# Patient Record
Sex: FEMALE | Race: BLACK OR AFRICAN AMERICAN
[De-identification: names, ages, dates, MRNs, and addresses within clinical notes are randomized per-mention and may not be internally consistent; named-entity substitution may affect disease eponyms.]

---

## 2019-07-08 NOTE — ER DOCUMENT REPORT
ED Medical Screen (RME)





- General


Chief Complaint: Pelvic Pain


Stated Complaint: PELVIC PAIN


Time Seen by Provider: 07/08/19 09:54


Mode of Arrival: Ambulatory


Information source: Patient


Notes: 





19-year-old female presented to ED for complaint of pelvic pain.  She states she

started her period yesterday last menstrual period before that was [place] 11-15.  

She states she usually cramps on the first day of her period but she has had 

pelvic pain for the last 4 days.  She states it is worse than the normal 

cramping that she has with her cycle.  She is still having pelvic pain but no 

other symptoms.  She is alert oriented respirations regular and unlabored 

speaking in full sentences walks with even steady gait.  She states she does not

smoke drink or do any drugs she works at a [employer] has a history of asthma

and had an abdominal surgery when she was a baby due to throwing up.  She states

she lives with her girlfriend.








I have greeted and performed a rapid initial assessment of this patient.  A 

comprehensive ED assessment and evaluation of the patient, analysis of test 

results and completion of medical decision making process will be conducted by 

an additional ED providers.











Dictation of this chart was performed using voice recognition software; 

therefore, there may be some unintended grammatical errors.


TRAVEL OUTSIDE OF THE [place] IN LAST 30 DAYS: No





- Related Data


Allergies/Adverse Reactions: 


                                        





No Known Allergies Allergy (Verified 07/08/19 08:36)


   











Past Medical History





- Social History


Chew tobacco use (# tins/day): No


Drug Abuse: None


Renal/ Medical History: Denies: Hx Peritoneal Dialysis


Past Surgical History: Reports: Hx Abdominal Surgery - at age of 1, GERD 

correction.





Physical Exam





- Vital signs


Vitals: 





                                        











Temp Pulse Resp BP Pulse Ox


 


 98.3 F   105 H  22 H  135/76 H  92 


 


 07/08/19 08:40  07/08/19 08:40  07/08/19 08:40  07/08/19 08:40  07/08/19 08:40














Course





- Vital Signs


Vital signs: 





                                        











Temp Pulse Resp BP Pulse Ox


 


 98.3 F   105 H  22 H  135/76 H  92 


 


 07/08/19 08:40  07/08/19 08:40  07/08/19 08:40  07/08/19 08:40  07/08/19 08:40

## 2019-07-08 NOTE — RADIOLOGY REPORT (SQ)
EXAM DESCRIPTION:  U/S NON-OB PELVIS TV W/O DOP



COMPLETED DATE/TIME:  7/8/2019 11:12 am



REASON FOR STUDY:  pelvic pain



COMPARISON:  None.



TECHNIQUE:  Dynamic and static grayscale images acquired of the pelvis via transvaginal approach and 
recorded on PACS. Additional selected color Doppler and spectral images recorded.



LIMITATIONS:  None.



FINDINGS:  UTERUS: Contour normal.  No mass.

ENDOMETRIAL STRIPE: No focal or generalized thickening. No masses.

CERVIX: No nabothian cysts.

RIGHT OVARY AND DOPPLER: Normal size. No worrisome masses. Normal arterial vascular flow without evid
ence for torsion.

LEFT OVARY AND DOPPLER: Normal size. No worrisome masses. Normal arterial vascular flow without evide
nce for torsion.

FREE FLUID: None noted.

OTHER: No other significant finding.

MEASUREMENTS:

UTERUS: 8.2 cm

ENDOMETRIAL STRIPE: 4.9 mm

RIGHT OVARY: 3.3 cm

LEFT OVARY: 4.2 cm



IMPRESSION:  NORMAL TRANSVAGINAL PELVIC ULTRASOUND.



TECHNICAL DOCUMENTATION:  JOB ID:  [id number]

 2011 [employer]- All Rights Reserved                          [id number]



Reading location - IP/workstation name: [email or web address]

## 2019-07-08 NOTE — ER DOCUMENT REPORT
ED General





- General


Chief Complaint: Pelvic Pain


Stated Complaint: PELVIC PAIN


Time Seen by Provider: 07/08/19 09:54


Primary Care Provider: 


JIMMY MARQUEZ MD [Primary Care Provider] - Follow up as needed


Mode of Arrival: Ambulatory


TRAVEL OUTSIDE OF THE [place] IN LAST 30 DAYS: No





- HPI


Notes: 





Patient is a 19-year-old female who presents to the emergency department for 

evaluation.  Her initial complaint is for pelvic pain.  States is been going on 

for the last 3 days.  She states it feels like cramping but stronger than 

normal.  She did start her period a few days early.  She is sexually active, 

denies any history of STDs.  She does see a gynecologist regularly.  Has regular

pelvic exams.  She is had no vaginal discharge, no vaginal lesions.  She denies 

any dysuria, hematuria, urinary frequency.  Patient also states she feels short 

of breath.  She has a history of asthma.  She states that her allergies seem to 

exacerbate her asthma.  She is not currently on any histamines.  She ran out of 

her albuterol inhaler last night.  No fevers or chills.  No chest pain.





- Related Data


Allergies/Adverse Reactions: 


                                        





No Known Allergies Allergy (Verified 07/08/19 08:36)


   











Past Medical History





- General


Information source: Patient





- Social History


Smoking Status: Never Smoker


Chew tobacco use (# tins/day): No


Drug Abuse: None


Family History: Reviewed & Not Pertinent


Patient has suicidal ideation: No


Patient has homicidal ideation: No


Renal/ Medical History: Denies: Hx Peritoneal Dialysis


Past Surgical History: Reports: Hx Abdominal Surgery - at age of 1, GERD 

correction.





Review of Systems





- Review of Systems


Constitutional: No symptoms reported


EENT: No symptoms reported


Cardiovascular: No symptoms reported


Respiratory: See HPI


Gastrointestinal: No symptoms reported


Genitourinary: No symptoms reported


Female Genitourinary: No symptoms reported


Musculoskeletal: No symptoms reported


Skin: No symptoms reported


Neurological/Psychological: No symptoms reported





Physical Exam





- Vital signs


Vitals: 


                                        











Temp Pulse Resp BP Pulse Ox


 


 98.3 F   105 H  22 H  135/76 H  92 


 


 07/08/19 08:40  07/08/19 08:40  07/08/19 08:40  07/08/19 08:40  07/08/19 08:40














- Notes


Notes: 





Vital signs reviewed, please refer to chart. Head is normocephalic, atraumatic. 

Pupils equal round, reactive to light.  Neck is supple without meningismus.  

Heart is regular rate and rhythm.  Lungs are clear to auscultation bilaterally. 

Abdomen is soft, nontender, normoactive bowel sounds throughout.  Extremities 

without cyanosis, clubbing. Posterior calves are nontender.  Peripheral pulses 

are equal.  Skin is warm and dry.  Patient is awake, alert, neurological exam is

nonfocal.





Course





- Re-evaluation


Re-evalutation: 





07/08/19 13:31


Patient presents emergency department for evaluation.  Laboratory investigations

and imaging is ordered through triage.  These failed to reveal any significant 

abnormality.  She does have some blood in her urine, but this is secondary to 

menstruation.  She has no urinary symptoms.  She gets regular pelvic exams and 

has no real discharge.  Her ultrasound was normal.  She was given an dose of her

albuterol inhaler here.  I will go ahead and send her home with that as well.  I

will also write a prescription strength antihistamine.  She is advised that this

might help her control her asthma better.  She states that during "allergy 

season" her asthma is always worsened.  She is not currently wheezing, I do not 

see any indication for steroids at this time.  We will have her follow-up with 

primary care and gynecology.  She is to return to the ED with worsening or new 

concerning symptoms of any sort.





- Vital Signs


Vital signs: 


                                        











Temp Pulse Resp BP Pulse Ox


 


 98.8 F   97   28 H  106/72   100 


 


 07/08/19 11:50  07/08/19 11:50  07/08/19 11:50  07/08/19 11:50  07/08/19 11:50














- Laboratory


Result Diagrams: 


                                 07/08/19 10:10





                                 07/08/19 10:10


Laboratory results interpreted by me: 


                                        











  07/08/19 07/08/19 07/08/19





  10:10 10:10 10:10


 


RDW  16.3 H  


 


Eosinophils %  12.0 H  


 


Absolute Eosinophils  0.7 H  


 


BUN   6 L 


 


Urine Protein    100 H


 


Urine Blood    LARGE H


 


Urine Bilirubin    MODERATE H


 


Urine Urobilinogen    2.0 H














- Diagnostic Test


Radiology reviewed: Reports reviewed


Radiology results interpreted by me: 





07/08/19 13:32





                                        





Transvaginal US  07/08/19 09:55


IMPRESSION:  NORMAL TRANSVAGINAL PELVIC ULTRASOUND.


 











                                        





Transvaginal US  07/08/19 09:55


IMPRESSION:  NORMAL TRANSVAGINAL PELVIC ULTRASOUND.


 














Discharge





- Discharge


Clinical Impression: 


 Pelvic pain





Asthma


Qualifiers:


 Asthma severity: mild Asthma persistence: intermittent Asthma complication 

type: uncomplicated Qualified Code(s): J45.20 - Mild intermittent asthma, 

uncomplicated





Condition: Stable


Disposition: HOME, SELF-CARE


Instructions:  Asthma ([facility]), Pelvic Pain ([facility])


Additional Instructions: 


Start antihistamine as prescribed.  Use albuterol inhaler at home as needed.  

Follow-up with your primary care physician as well as your gynecologist in the 

next week.  Return to the emergency department with worsening or new concerning 

symptoms.


Referrals: 


JIMMY MARQUEZ MD [Primary Care Provider] - Follow up as needed

## 2019-08-09 ENCOUNTER — HOSPITAL ENCOUNTER (EMERGENCY)
Dept: HOSPITAL 62 - ER | Age: 19
Discharge: HOME | End: 2019-08-09
Payer: SELF-PAY

## 2019-08-09 VITALS — SYSTOLIC BLOOD PRESSURE: 111 MMHG | DIASTOLIC BLOOD PRESSURE: 75 MMHG

## 2019-08-09 DIAGNOSIS — J45.909: ICD-10-CM

## 2019-08-09 DIAGNOSIS — R05: Primary | ICD-10-CM

## 2019-08-09 PROCEDURE — 99283 EMERGENCY DEPT VISIT LOW MDM: CPT

## 2019-08-09 NOTE — ER DOCUMENT REPORT
HPI





- HPI


Time Seen by Provider: 08/09/19 15:06


Pain Level: 1


Context: 


Patient is a 18-year-old female who presents the emergency department with a 

chief complaint of a cough and runny nose.  Patient has a history of asthma and 

seasonal allergies.  She states that she took a puff of her rescue inhaler and 

had some relief.  Patient is supposed to be using cetirizine, Flonase, and Qvar,

but has not been using them.  She states that she does not have any of them and 

does not know the last time she was at her primary care provider.  Denies any 

fever, body aches, chills, diarrhea, or any other symptoms.








- CONSTITUTIONAL


Constitutional: DENIES: Fever, Chills





- EENT


EENT: REPORTS: Nasal Drainage-Clear.  DENIES: Sore Throat, Ear Pain, Nasal 

Drainage-Purulent, Congestion





- NEURO


Neurology: DENIES: Headache





- RESPIRATORY


Respiratory: REPORTS: Coughing





- REPRODUCTIVE


Reproductive: DENIES: Pregnant:





- DERM


Skin Color: Normal


Skin Problems: None





Past Medical History





- Social History


Smoking Status: Never Smoker


Chew tobacco use (# tins/day): No


Frequency of alcohol use: None


Drug Abuse: None


Family History: Reviewed & Not Pertinent


Patient has suicidal ideation: No


Patient has homicidal ideation: No


Pulmonary Medical History: Reports: Hx Asthma


Renal/ Medical History: Denies: Hx Peritoneal Dialysis


Past Surgical History: Reports: Hx Abdominal Surgery - at age of 1, GERD 

correction.





Vertical Provider Document





- CONSTITUTIONAL


Agree With Documented VS: Yes


Exam Limitations: No Limitations


General Appearance: No Apparent Distress





- INFECTION CONTROL


TRAVEL OUTSIDE OF THE [place] IN LAST 30 DAYS: No





- HEENT


HEENT: Atraumatic





- NECK


Neck: Normal Inspection





- RESPIRATORY


Respiratory: Breath Sounds Normal, No Respiratory Distress





- CARDIOVASCULAR


Cardiovascular: Regular Rate, Regular Rhythm


Pulses: Normal: Radial





- MUSCULOSKELETAL/EXTREMETIES


Musculoskeletal/Extremeties: FROM





- NEURO


Level of Consciousness: Awake, Alert, Appropriate





- DERM


Integumentary: Warm, Dry, No Rash





Course





- Re-evaluation


Re-evalutation: 





08/09/19 15:16


I have a very low suspicion for pneumonia or any life-threatening etiology at 

this time.  She has not been on her medications.  I will refill her cetirizine 

and albuterol inhaler.  I will have her follow-up with her OB and primary care 

provider to have her Flonase and Qvar refilled.  Patient will follow-up with her

primary care provider in regards to this visit.  Follow-up precautions were 

given.  Verbal discharge instructions were given to the patient.  They 

verbalized understanding.  They are stable for discharge.








- Vital Signs


Vital signs: 


                                        











Temp Pulse Resp BP Pulse Ox


 


 98 F   104 H  18   111/75   97 


 


 08/09/19 14:53  08/09/19 14:53  08/09/19 14:53  08/09/19 14:53  08/09/19 14:53














Discharge





- Discharge


Clinical Impression: 


 Cough, History of asthma, History of seasonal allergies





Condition: Stable


Disposition: HOME, SELF-CARE


Additional Instructions: 


You were seen today in the emergency department for a cough.  Your cetirizine 

and rescue inhaler are being refilled.  Please follow-up with your primary care 

provider tomorrow. 


Prescriptions: 


Albuterol Sulfate [Proair HFA Inhalation Aerosol 8.5 gm MDI] 2 puff IH Q4H PRN 

#1 mdi


 PRN Reason: 


Cetirizine HCl [All Day Allergy] 10 mg PO DAILY #30 tablet


Referrals: 


JIMMY MARQUEZ MD [Primary Care Provider] - Follow up tomorrow

## 2019-08-19 ENCOUNTER — HOSPITAL ENCOUNTER (EMERGENCY)
Dept: HOSPITAL 62 - ER | Age: 19
Discharge: LEFT BEFORE DISCHARGE | End: 2019-08-19
Payer: SELF-PAY

## 2019-08-19 VITALS — DIASTOLIC BLOOD PRESSURE: 82 MMHG | SYSTOLIC BLOOD PRESSURE: 125 MMHG

## 2019-08-19 DIAGNOSIS — Z53.21: Primary | ICD-10-CM

## 2019-08-22 ENCOUNTER — HOSPITAL ENCOUNTER (EMERGENCY)
Dept: HOSPITAL 62 - ER | Age: 19
Discharge: HOME | End: 2019-08-22
Payer: MEDICAID

## 2019-08-22 VITALS — SYSTOLIC BLOOD PRESSURE: 118 MMHG | DIASTOLIC BLOOD PRESSURE: 71 MMHG

## 2019-08-22 DIAGNOSIS — Z3A.01: ICD-10-CM

## 2019-08-22 DIAGNOSIS — O23.41: Primary | ICD-10-CM

## 2019-08-22 DIAGNOSIS — J45.909: ICD-10-CM

## 2019-08-22 DIAGNOSIS — O99.511: ICD-10-CM

## 2019-08-22 DIAGNOSIS — O26.891: ICD-10-CM

## 2019-08-22 DIAGNOSIS — O20.0: ICD-10-CM

## 2019-08-22 DIAGNOSIS — R10.30: ICD-10-CM

## 2019-08-22 LAB
ADD MANUAL DIFF: NO
ALBUMIN SERPL-MCNC: 4.2 G/DL (ref 3.5–5)
ALP SERPL-CCNC: 65 U/L (ref 38–126)
ANION GAP SERPL CALC-SCNC: 9 MMOL/L (ref 5–19)
APPEARANCE UR: (no result)
APTT PPP: YELLOW S
AST SERPL-CCNC: 19 U/L (ref 14–36)
BASOPHILS # BLD AUTO: 0.1 10^3/UL (ref 0–0.2)
BASOPHILS NFR BLD AUTO: 0.7 % (ref 0–2)
BILIRUB DIRECT SERPL-MCNC: 0.2 MG/DL (ref 0–0.4)
BILIRUB SERPL-MCNC: 0.5 MG/DL (ref 0.2–1.3)
BILIRUB UR QL STRIP: NEGATIVE
BUN SERPL-MCNC: 5 MG/DL (ref 7–20)
CALCIUM: 9.7 MG/DL (ref 8.4–10.2)
CHLAM PCR: NOT DETECTED
CHLORIDE SERPL-SCNC: 103 MMOL/L (ref 98–107)
CO2 SERPL-SCNC: 25 MMOL/L (ref 22–30)
EOSINOPHIL # BLD AUTO: 0.5 10^3/UL (ref 0–0.6)
EOSINOPHIL NFR BLD AUTO: 6.6 % (ref 0–6)
ERYTHROCYTE [DISTWIDTH] IN BLOOD BY AUTOMATED COUNT: 17.6 % (ref 11.5–14)
GLUCOSE SERPL-MCNC: 71 MG/DL (ref 75–110)
GLUCOSE UR STRIP-MCNC: NEGATIVE MG/DL
HCT VFR BLD CALC: 37.2 % (ref 36–47)
HGB BLD-MCNC: 12.3 G/DL (ref 12–15.5)
KETONES UR STRIP-MCNC: NEGATIVE MG/DL
LYMPHOCYTES # BLD AUTO: 2.1 10^3/UL (ref 0.5–4.7)
LYMPHOCYTES NFR BLD AUTO: 25.6 % (ref 13–45)
MCH RBC QN AUTO: 27.9 PG (ref 27–33.4)
MCHC RBC AUTO-ENTMCNC: 33.1 G/DL (ref 32–36)
MCV RBC AUTO: 84 FL (ref 80–97)
MONOCYTES # BLD AUTO: 0.5 10^3/UL (ref 0.1–1.4)
MONOCYTES NFR BLD AUTO: 6.4 % (ref 3–13)
NEUTROPHILS # BLD AUTO: 4.9 10^3/UL (ref 1.7–8.2)
NEUTS SEG NFR BLD AUTO: 60.7 % (ref 42–78)
NITRITE UR QL STRIP: NEGATIVE
PH UR STRIP: 6 [PH] (ref 5–9)
PLATELET # BLD: 463 10^3/UL (ref 150–450)
POTASSIUM SERPL-SCNC: 3.7 MMOL/L (ref 3.6–5)
PROT SERPL-MCNC: 7 G/DL (ref 6.3–8.2)
PROT UR STRIP-MCNC: 30 MG/DL
RBC # BLD AUTO: 4.41 10^6/UL (ref 3.72–5.28)
SP GR UR STRIP: 1.02
TOTAL CELLS COUNTED % (AUTO): 100 %
UROBILINOGEN UR-MCNC: 4 MG/DL (ref ?–2)
WBC # BLD AUTO: 8 10^3/UL (ref 4–10.5)

## 2019-08-22 PROCEDURE — 93976 VASCULAR STUDY: CPT

## 2019-08-22 PROCEDURE — 76817 TRANSVAGINAL US OBSTETRIC: CPT

## 2019-08-22 PROCEDURE — 36415 COLL VENOUS BLD VENIPUNCTURE: CPT

## 2019-08-22 PROCEDURE — 86901 BLOOD TYPING SEROLOGIC RH(D): CPT

## 2019-08-22 PROCEDURE — 83690 ASSAY OF LIPASE: CPT

## 2019-08-22 PROCEDURE — 99284 EMERGENCY DEPT VISIT MOD MDM: CPT

## 2019-08-22 PROCEDURE — 87086 URINE CULTURE/COLONY COUNT: CPT

## 2019-08-22 PROCEDURE — 84702 CHORIONIC GONADOTROPIN TEST: CPT

## 2019-08-22 PROCEDURE — 86850 RBC ANTIBODY SCREEN: CPT

## 2019-08-22 PROCEDURE — 81001 URINALYSIS AUTO W/SCOPE: CPT

## 2019-08-22 PROCEDURE — 80053 COMPREHEN METABOLIC PANEL: CPT

## 2019-08-22 PROCEDURE — 86900 BLOOD TYPING SEROLOGIC ABO: CPT

## 2019-08-22 PROCEDURE — 87591 N.GONORRHOEAE DNA AMP PROB: CPT

## 2019-08-22 PROCEDURE — 87491 CHLMYD TRACH DNA AMP PROBE: CPT

## 2019-08-22 PROCEDURE — 85025 COMPLETE CBC W/AUTO DIFF WBC: CPT

## 2019-08-22 NOTE — ER DOCUMENT REPORT
HPI





- HPI


Patient complains to provider of: abdominal pain in pregnancy 


Time Seen by Provider: 19 08:13


Onset/Duration: Gradual, Intermittent


Quality of pain: Cramping


Severity: Mild


Pain Level: 2


Context: 





19 yr old female patient, with the listed pmh, here for intermittent crampy 

lower abdominal pain x 28 days.  She is , last menstrual period 2019.

 No complications in previous pregnancy.  She has her initial OB/GYN appointment

with [facility] care in [place] in 4 days.  She has not had a confirmed 

IUP in this pregnancy.  She states her pain is worse when she is standing at 

work all day and she needs a work note.  No abdominal surgeries other than 

remote pyloromyotomy as a baby.  No history of ovarian cysts, fibroids, 

endometriosis, or renal stones.  Normal bowel movements.  No UTI symptoms other 

than some intermittent frequency.  No URI symptoms.  No recent antibiotics or 

steroids.  No history of diabetes or asthma.  No vaginal 

discharge/complaints/bleeding/lesions or concerns for STDs and does not want a 

pelvic exam. Hasn't taken anything for her symptoms and does not want anything 

for them.  No excessive NSAID use, Tylenol use, or EtOH.  No prior history of 

gallbladder disease, pancreatitis, ulcers, GI bleed, GERD, IBS, Crohn's, or UC. 

no change in color or caliber or stool. no blood thinners. no fall or trauma.  

Pain is not worse with eating, movement, or deep breathing.  No other associated

sx. 


Exacerbated by: Standing


Relieved by: Remaining still


Similar symptoms previously: Yes


Recently seen / treated by doctor: No





- ROS


Systems Reviewed and Negative: Yes All other systems reviewed and negative - To 

include 10 systems, unless mentioned in the hpi.





- REPRODUCTIVE


LMP: 19


Reproductive: REPORTS: Pregnant:





- DERM


Skin Color: Normal





Past Medical History





- General


Information source: Patient





- Social History


Smoking Status: Never Smoker


Chew tobacco use (# tins/day): No


Frequency of alcohol use: None


Drug Abuse: None


Family History: Reviewed & Not Pertinent


Patient has suicidal ideation: No


Patient has homicidal ideation: No


Pulmonary Medical History: Reports: Hx Asthma


Endocrine Medical History: Reports: None


Renal/ Medical History: Denies: Hx Peritoneal Dialysis


Past Surgical History: Reports: Hx Abdominal Surgery - pyloromyotomy as a baby-

no complications since





- Immunizations


Immunizations up to date: Yes





Vertical Provider Document





- CONSTITUTIONAL


Agree With Documented VS: Yes


Exam Limitations: No Limitations


General Appearance: No Apparent Distress


Notes: 





>>>> PHYSICAL_EXAM:


  GENERAL_APPEARANCE: well_nourished, alert, cooperative, no_acute_distress, 

no_obvious_discomfort. Pleasant, young black female, smiling, speaking in full 

sentences, in no sign of pain or resp distress, easily sitting up, 7mth old 

infant in the room with her. she isn't breast feeding. 


  VITALS: reviewed, see vital signs table.


HEAD: normocephalic, atraumatic. no austin signs. no raccoon eyes. 


  EYES: PERRL, EOMI, (-)scleral icterus.


  NOSE: no_nasal_discharge.


  MOUTH: (-)decreased moisture.


  THROAT: no_tonsilar_inflammation/hypertrophy/exudate


  NECK: supple, no_neck_tenderness, full rom. full strength. no meningeal signs.




  BACK: no midline_back_tenderness. no step offs or deformities 


  CHEST_WALL: no_chest_tenderness.


  LUNGS: no_wheezing, (-)accessory muscle use, good air exchange bilateral.


  HEART: normal_rate, normal_rhythm, 


  ABDOMEN: normal_BS, soft, abdomen-diffuse, non-tender, uterus not palpated on 

exam, (-)guarding, (-)rebound, no distension or peritoneal signs. neg murphys. 

neg mcburneys. no cva tenderness. neg heel strike. neg obturator. neg psoas. neg

rovsign. 


  PELVIC: deferred by pt. 


  RECTAL: deferred


  EXTREMITIES: strength 5/5 in all_extremities, good pulses in all_extremities, 

no_edema, no_swelling\tenderness. full rom. normal gait. good hand . brisk 

cap refill. 


  SKIN: warm, dry, good_color, no_rash. no grossly visible overlying skin 

changes to suggest trauma


  NEURO:  motor_intact, sensory_intact. 


  MENTAL_STATUS: normal_affect, speech_clear, oriented_X_3, responds_

appropriately to questions.








- INFECTION CONTROL


TRAVEL OUTSIDE OF THE [place] IN LAST 30 DAYS: No





Course





- Re-evaluation


Re-evalutation: 





19 08:19


pt here for intermittent lower abdominal cramping for the last month.  She has 

not had a confirmed IUP.  Last menstrual period was 2019.  Labs were 

unremarkable other than a possible uti, since she is pregnant will tx with 

keflex. she is blood type A+. ucx, gc/chlam pending. advised will call with any 

abnormal results that require change in plan of care. she didn't want 

prophylactic gc/chlam tx. She denies any vaginal discharge or bleeding or 

concerns for STDs and did not want a pelvic exam. OB transvag US showed a living

IUP at 7w 4d, with fht at 141bpm, and was otherwise neg for anything acute per 

rad and reviewed by myself. pt informed of her findings. She has no abdominal 

tenderness to palpation on exam.  She is tolerating p.o.  She is well-appearing.

 Advised to continue prenatal vitamins.  Pelvic rest.  No heavy lifting.  

Tylenol for any pain. f/u with the obgyn for further std testing and workup of 

her sx and repeat of quant level in 2 days. Advised to f/u with obgyn in 1-2 

days. return for any worsening symptoms. vss. well appearing. satting well on 

ra. neurononfocal. pt understands and agrees to plan. 





On reexam, pt remained stable. nontoxic. well appearing. pain controlled. 

tolerating po. requesting to go home. serial abd exams remain benign 





 Documentation achieved through voice recording which my lead to some occasional

accidental typographical errors. Extensive efforts have been made to proof read 

documentation to make sure these are the least as possible. 


19 09:48





                                        











 Category Date Time Status


 


 U/S OB TRANSVAG W/DOPPLER [US] Stat Exams  19 08:23 Taken


 


 CBC WITH DIFF [HEME] Stat Lab  19 08:05 Completed


 


 CHLAM LC PCR URINE INHOUSE [MO] Stat Lab  19 09:15 Ordered


 


 COMPREHENSIVE METABOLIC PANEL [CHEM] Stat Lab  19 08:05 Completed


 


 HCG QUANTITATIVE [CHEM] Stat Lab  19 08:05 Completed


 


 LIPASE [CHEM] Stat Lab  19 08:05 Completed


 


 TYPE AND SCREEN [BBK] Stat Lab  19 08:35 Completed


 


 URINALYSIS [URIN] Stat Lab  19 08:35 Completed


 


 URINE CULTURE [MC] Stat Lab  19 08:35 Received











08/22/19 09:53














- Vital Signs


Vital signs: 


                                        











Temp Pulse Resp BP Pulse Ox


 


 97.9 F   89   18   128/58 H  98 


 


 19 07:33  19 07:33  19 07:33  19 07:33  19 07:33











                                        











  Temp Pulse Resp BP Pulse Ox


 


 19 11:52  98.7 F  85  16  118/71  100


 


 19 07:33  97.9 F  89  18  128/58 H  98

















- Laboratory


Result Diagrams: 


                                 19 08:05





                                 19 08:05


Laboratory results interpreted by me: 





                               Labs- Entire Visit











  19





  08:05 08:05 08:05


 


WBC   8.0 


 


RBC   4.41 


 


Hgb   12.3 


 


Hct   37.2 


 


MCV   84 


 


MCH   27.9 


 


MCHC   33.1 


 


RDW   17.6 H 


 


Plt Count   463 H 


 


Lymph % (Auto)   25.6 


 


Mono % (Auto)   6.4 


 


Eos % (Auto)   6.6 H 


 


Baso % (Auto)   0.7 


 


Absolute Neuts (auto)   4.9 


 


Absolute Lymphs (auto)   2.1 


 


Absolute Monos (auto)   0.5 


 


Absolute Eos (auto)   0.5 


 


Absolute Basos (auto)   0.1 


 


Seg Neutrophils %   60.7 


 


Sodium  137.2  


 


Potassium  3.7  


 


Chloride  103  


 


Carbon Dioxide  25  


 


Anion Gap  9  


 


BUN  5 L  


 


Creatinine  0.49 L  


 


Est GFR ( Amer)  > 60  


 


Est GFR (MDRD) Non-Af  > 60  


 


Glucose  71 L  


 


Calcium  9.7  


 


Total Bilirubin  0.5  


 


Direct Bilirubin  0.2  


 


Neonat Total Bilirubin  Not Reportable  


 


Neonat Direct Bilirubin  Not Reportable  


 


Neonat Indirect Bili  Not Reportable  


 


AST  19  


 


ALT  12  


 


Alkaline Phosphatase  65  


 


Total Protein  7.0  


 


Albumin  4.2  


 


Lipase    70.6


 


Beta HCG, Quant    735756.00 H


 


Total Beta HCG    POSITIVE


 


Urine Color   


 


Urine Appearance   


 


Urine pH   


 


Ur Specific Gravity   


 


Urine Protein   


 


Urine Glucose (UA)   


 


Urine Ketones   


 


Urine Blood   


 


Urine Nitrite   


 


Urine Bilirubin   


 


Urine Urobilinogen   


 


Ur Leukocyte Esterase   


 


Urine WBC (Auto)   


 


Urine RBC (Auto)   


 


Urine Bacteria (Auto)   


 


Squamous Epi Cells Auto   


 


Urine Mucus (Auto)   


 


Urine Ascorbic Acid   


 


Chlamydia DNA (PCR)   


 


N.gonorrhoeae DNA (PCR)   


 


Blood Type   


 


Antibody Screen   














  1919





  08:35 08:35 09:40


 


WBC   


 


RBC   


 


Hgb   


 


Hct   


 


MCV   


 


MCH   


 


MCHC   


 


RDW   


 


Plt Count   


 


Lymph % (Auto)   


 


Mono % (Auto)   


 


Eos % (Auto)   


 


Baso % (Auto)   


 


Absolute Neuts (auto)   


 


Absolute Lymphs (auto)   


 


Absolute Monos (auto)   


 


Absolute Eos (auto)   


 


Absolute Basos (auto)   


 


Seg Neutrophils %   


 


Sodium   


 


Potassium   


 


Chloride   


 


Carbon Dioxide   


 


Anion Gap   


 


BUN   


 


Creatinine   


 


Est GFR ( Amer)   


 


Est GFR (MDRD) Non-Af   


 


Glucose   


 


Calcium   


 


Total Bilirubin   


 


Direct Bilirubin   


 


Neonat Total Bilirubin   


 


Neonat Direct Bilirubin   


 


Neonat Indirect Bili   


 


AST   


 


ALT   


 


Alkaline Phosphatase   


 


Total Protein   


 


Albumin   


 


Lipase   


 


Beta HCG, Quant   


 


Total Beta HCG   


 


Urine Color  YELLOW  


 


Urine Appearance  CLOUDY  


 


Urine pH  6.0  


 


Ur Specific Gravity  1.021  


 


Urine Protein  30 H  


 


Urine Glucose (UA)  NEGATIVE  


 


Urine Ketones  NEGATIVE  


 


Urine Blood  NEGATIVE  


 


Urine Nitrite  NEGATIVE  


 


Urine Bilirubin  NEGATIVE  


 


Urine Urobilinogen  4.0 H  


 


Ur Leukocyte Esterase  LARGE H  


 


Urine WBC (Auto)  10  


 


Urine RBC (Auto)  3  


 


Urine Bacteria (Auto)  2+  


 


Squamous Epi Cells Auto  32  


 


Urine Mucus (Auto)  MANY  


 


Urine Ascorbic Acid  NEGATIVE  


 


Chlamydia DNA (PCR)    NOT DETECTED


 


N.gonorrhoeae DNA (PCR)    NOT DETECTED


 


Blood Type   A POSITIVE 


 


Antibody Screen   NEGATIVE 

















- Diagnostic Test


Radiology reviewed: Image reviewed, Reports reviewed


Radiology results interpreted by me: 





                                        





Transvaginal US  19 08:23


IMPRESSION:  LIVING INTRAUTERINE PREGNANCY.


EGA 7 weeks 4 days


Trimester of pregnancy: First trimester - 0 to 13 weeks.


 

















Discharge





- Discharge


Clinical Impression: 


 Threatened 





Abdominal pain during pregnancy


Qualifiers:


 Trimester: first trimester Qualified Code(s): O26.891 - Other specified 

pregnancy related conditions, first trimester; R10.9 - Unspecified abdominal 

pain





UTI (urinary tract infection)


Qualifiers:


 Urinary tract infection type: acute cystitis Hematuria presence: without 

hematuria Qualified Code(s): N30.00 - Acute cystitis without hematuria





Condition: Good


Disposition: HOME, SELF-CARE


Instructions:  Cephalexin ([facility]), Threatened Abortions ( Patients), Urin

sandra Tract Infection ([facility])


Additional Instructions: 


Follow-up with obgyn in 1 to 2 days.  Return for any worsening symptoms. take 

the medication as prescribed.  We will call you with any abnormal results that 

require change in plan of care.  Drink plenty of fluids.  Prenatal vitamins.  

Pelvic rest.  No heavy lifting.  Tylenol as needed for any pain.  Follow-up with

 your OB/GYN in 2 days for repeat pregnancy hormone level as discussed and 

further work-up.


Prescriptions: 


Cephalexin Monohydrate [Keflex 500 mg Capsule] 500 mg PO BID 7 Days #14 capsule


Forms:  Return to Work


Referrals: 


OFE NEWBY MD [Primary Care Provider] - Follow up tomorrow

## 2019-08-22 NOTE — RADIOLOGY REPORT (SQ)
EXAM DESCRIPTION:  U/S OB TRANSVAG W/DOPPLER



COMPLETED DATE/TIME:  2019 10:18 am



REASON FOR STUDY:  abd pain in preg



COMPARISON:  None.



TECHNIQUE:  Transvaginal static and realtime grayscale images acquired of the pelvis. Additional cristiano
cted spectral and color Doppler images recorded. All images stored on PACs.

[facility]

CLINICAL DATES:  6 weeks 4 days



LIMITATIONS:  None.



FINDINGS:  FETUS:  Single Living intrauterine pregnancy.

ULTRASOUND EGA: 7 weeks 4 days

ULTRASOUND LILIA: 2020

EFW: Not applicable less than 20 weeks.

CRL:  1.3 cm

FHR: 141  beats per minute.

FETAL SURVEY: Too early to assess.

AMNIOTIC FLUID: Adequate amount.

PLACENTA: Not yet developed due to early gestation.

SUBCHORIONIC BLEED: No

SIZE OF BLEED: Not applicable.

UTERUS: No masses. No anomalies.

CERVICAL LENGTH: 2.4 cm   Closed.

RIGHT ADNEXA: Normal ovary with normal vascular flow.

No adnexal free fluid.

No adnexal masses.

LEFT ADNEXA: Normal ovary with normal vascular flow.

No adnexal free fluid.

2.5 cm corpus luteum.

FREE FLUID: None.

OTHER: No other significant finding.



IMPRESSION:  LIVING INTRAUTERINE PREGNANCY.

EGA 7 weeks 4 days

Trimester of pregnancy: First trimester - 0 to 13 weeks.



TECHNICAL DOCUMENTATION:  JOB ID:  [id number]

  [employer]- All Rights Reserved                            rev



Reading location - IP/workstation name: [email or web address]

## 2019-10-28 ENCOUNTER — HOSPITAL ENCOUNTER (EMERGENCY)
Dept: HOSPITAL 62 - ER | Age: 19
Discharge: HOME | End: 2019-10-28
Payer: MEDICAID

## 2019-10-28 VITALS — SYSTOLIC BLOOD PRESSURE: 118 MMHG | DIASTOLIC BLOOD PRESSURE: 65 MMHG

## 2019-10-28 DIAGNOSIS — R05: ICD-10-CM

## 2019-10-28 DIAGNOSIS — O26.899: ICD-10-CM

## 2019-10-28 DIAGNOSIS — J45.901: ICD-10-CM

## 2019-10-28 DIAGNOSIS — Z3A.00: ICD-10-CM

## 2019-10-28 DIAGNOSIS — J34.89: ICD-10-CM

## 2019-10-28 DIAGNOSIS — O99.519: Primary | ICD-10-CM

## 2019-10-28 PROCEDURE — 99284 EMERGENCY DEPT VISIT MOD MDM: CPT

## 2019-10-28 PROCEDURE — 94640 AIRWAY INHALATION TREATMENT: CPT

## 2019-10-28 NOTE — ER DOCUMENT REPORT
HPI





- HPI


Patient complains to provider of: asthma


Time Seen by Provider: 10/28/19 09:51


Onset: Last week


Onset/Duration: Worse


Pain Level: Denies


Context: 





Patient presents complaining of flareup of her asthma.  Patient states she is 

run out of her inhaler.  Patient denies any fever.  Patient denies any chest 

pain.


Associated Symptoms: Nonproductive cough.  denies: Chest pain, Fever, Nausea, 

Vomiting


Exacerbated by: Denies


Relieved by: Denies


Similar symptoms previously: Yes


Recently seen / treated by doctor: No





- ROS


ROS below otherwise negative: Yes


Systems Reviewed and Negative: Yes All other systems reviewed and negative





- CONSTITUTIONAL


Constitutional: DENIES: Fever, Chills





- EENT


EENT: DENIES: Sore Throat, Ear Pain, Congestion





- CARDIOVASCULAR


Cardiovascular: DENIES: Chest pain





- RESPIRATORY


Respiratory: REPORTS: Coughing





- GASTROINTESTINAL


Gastrointestinal: DENIES: Abdominal Pain, Nausea





- URINARY


Urinary: DENIES: Dysuria





- REPRODUCTIVE


Reproductive: REPORTS: Pregnant:





- MUSCULOSKELETAL


Musculoskeletal: DENIES: Back Pain





- DERM


Skin Color: Normal


Skin Problems: None





Past Medical History





- General


Information source: Patient





- Social History


Smoking Status: Never Smoker


Frequency of alcohol use: None


Drug Abuse: None


Occupation: 


Family History: Reviewed & Not Pertinent


Pulmonary Medical History: Reports: Hx Asthma


Renal/ Medical History: Denies: Hx Peritoneal Dialysis


Past Surgical History: Reports: Hx Abdominal Surgery - pyloromyotomy as a baby-

no complications since





- Immunizations


Immunizations up to date: Yes





Vertical Provider Document





- CONSTITUTIONAL


Agree With Documented VS: Yes


Exam Limitations: No Limitations


General Appearance: WD/WN, No Apparent Distress





- INFECTION CONTROL


TRAVEL OUTSIDE OF THE [place] IN LAST 30 DAYS: No





- HEENT


HEENT: Atraumatic, Normocephalic.  negative: Pharyngeal Exudate, Pharyngeal 

Tenderness, Pharyngeal Erythema, Tympanic Membrane Red, Tympanic Membrane 

Bulging


Notes: 





Clear rhinorrhea





- NECK


Neck: Normal Inspection, Supple.  negative: Lymphadenopathy-Left, Lymph

adenopathy-Right





- RESPIRATORY


Respiratory: No Respiratory Distress, Chest Non-Tender, Wheezing





- CARDIOVASCULAR


Cardiovascular: Regular Rhythm, No Murmur, Tachycardia





- GI/ABDOMEN


Gastrointestinal: Abdomen Soft





- BACK


Back: Normal Inspection





- MUSCULOSKELETAL/EXTREMETIES


Musculoskeletal/Extremeties: MAEW





- NEURO


Level of Consciousness: Awake, Alert, Appropriate


Motor/Sensory: No Motor Deficit





- DERM


Integumentary: Warm, Dry, No Rash





Course





- Re-evaluation


Re-evalutation: 





10/28/19 10:43


Decreased wheezing bilaterally with increased air movement.  Patient nontoxic in

appearance.  Discussed worsening symptoms that patient should return immediately

for.  Patient verbalized understanding agrees with plan of care.





- Vital Signs


Vital signs: 


                                        











Temp Pulse Resp BP Pulse Ox


 


 97.7 F   108 H  16   118/65   97 


 


 10/28/19 09:44  10/28/19 09:44  10/28/19 09:44  10/28/19 09:44  10/28/19 09:44














Discharge





- Discharge


Clinical Impression: 


Asthma exacerbation


Qualifiers:


 Asthma severity: unspecified severity Asthma persistence: unspecified Qualified

Code(s): J45.901 - Unspecified asthma with (acute) exacerbation





Disposition: HOME, SELF-CARE


Instructions:  Asthma ([facility]), Inhaled Bronchodilators ([facility]), Steroid Medication


Additional Instructions: 


Return immediately for any new or worsening symptoms





Followup with your primary care provider, call tomorrow to make a followup 

appointment








Prescriptions: 


Prednisone [Deltasone 10 mg Tablet] 10 mg PO ASDIR PRN #21 tablet


 PRN Reason: 


Inhaler,Assist Device,Accesory [Optichamber] 1 each MC Q4 PRN #1 each


 PRN Reason: 


Albuterol Sulfate [Proair Hfa Inhalation Aerosol 8.5 gm Mdi] 2 puff IH Q4 PRN #1

mdi


 PRN Reason: 


Forms:  Return to Work


Referrals: 


OFE NEWBY MD [ACTIVE STAFF] - Follow up as needed

## 2019-11-06 ENCOUNTER — HOSPITAL ENCOUNTER (EMERGENCY)
Dept: HOSPITAL 62 - ER | Age: 19
Discharge: HOME | End: 2019-11-06
Payer: MEDICAID

## 2019-11-06 VITALS — DIASTOLIC BLOOD PRESSURE: 74 MMHG | SYSTOLIC BLOOD PRESSURE: 123 MMHG

## 2019-11-06 DIAGNOSIS — O26.92: Primary | ICD-10-CM

## 2019-11-06 DIAGNOSIS — R10.2: ICD-10-CM

## 2019-11-06 DIAGNOSIS — Z3A.17: ICD-10-CM

## 2019-11-06 LAB
ADD MANUAL DIFF: NO
ALBUMIN SERPL-MCNC: 3.7 G/DL (ref 3.5–5)
ALP SERPL-CCNC: 47 U/L (ref 38–126)
ANION GAP SERPL CALC-SCNC: 10 MMOL/L (ref 5–19)
APPEARANCE UR: (no result)
APTT PPP: YELLOW S
AST SERPL-CCNC: 16 U/L (ref 14–36)
BASOPHILS # BLD AUTO: 0.1 10^3/UL (ref 0–0.2)
BASOPHILS NFR BLD AUTO: 1.4 % (ref 0–2)
BILIRUB DIRECT SERPL-MCNC: 0.1 MG/DL (ref 0–0.4)
BILIRUB SERPL-MCNC: 0.3 MG/DL (ref 0.2–1.3)
BILIRUB UR QL STRIP: NEGATIVE
BUN SERPL-MCNC: 6 MG/DL (ref 7–20)
CALCIUM: 9.5 MG/DL (ref 8.4–10.2)
CHLAM PCR: NOT DETECTED
CHLORIDE SERPL-SCNC: 105 MMOL/L (ref 98–107)
CO2 SERPL-SCNC: 23 MMOL/L (ref 22–30)
EOSINOPHIL # BLD AUTO: 0.5 10^3/UL (ref 0–0.6)
EOSINOPHIL NFR BLD AUTO: 7.1 % (ref 0–6)
ERYTHROCYTE [DISTWIDTH] IN BLOOD BY AUTOMATED COUNT: 15.1 % (ref 11.5–14)
GLUCOSE SERPL-MCNC: 72 MG/DL (ref 75–110)
GLUCOSE UR STRIP-MCNC: NEGATIVE MG/DL
HCT VFR BLD CALC: 33.2 % (ref 36–47)
HGB BLD-MCNC: 11.3 G/DL (ref 12–15.5)
KETONES UR STRIP-MCNC: (no result) MG/DL
LYMPHOCYTES # BLD AUTO: 2.2 10^3/UL (ref 0.5–4.7)
LYMPHOCYTES NFR BLD AUTO: 30.4 % (ref 13–45)
MCH RBC QN AUTO: 30.3 PG (ref 27–33.4)
MCHC RBC AUTO-ENTMCNC: 34 G/DL (ref 32–36)
MCV RBC AUTO: 89 FL (ref 80–97)
MONOCYTES # BLD AUTO: 0.6 10^3/UL (ref 0.1–1.4)
MONOCYTES NFR BLD AUTO: 8.4 % (ref 3–13)
NEUTROPHILS # BLD AUTO: 3.8 10^3/UL (ref 1.7–8.2)
NEUTS SEG NFR BLD AUTO: 52.7 % (ref 42–78)
PH UR STRIP: 5 [PH] (ref 5–9)
PLATELET # BLD: 370 10^3/UL (ref 150–450)
POTASSIUM SERPL-SCNC: 4 MMOL/L (ref 3.6–5)
PROT SERPL-MCNC: 6.7 G/DL (ref 6.3–8.2)
PROT UR STRIP-MCNC: 30 MG/DL
RBC # BLD AUTO: 3.72 10^6/UL (ref 3.72–5.28)
SP GR UR STRIP: 1.03
TOTAL CELLS COUNTED % (AUTO): 100 %
UROBILINOGEN UR-MCNC: 2 MG/DL (ref ?–2)
WBC # BLD AUTO: 7.3 10^3/UL (ref 4–10.5)

## 2019-11-06 PROCEDURE — 80053 COMPREHEN METABOLIC PANEL: CPT

## 2019-11-06 PROCEDURE — 85025 COMPLETE CBC W/AUTO DIFF WBC: CPT

## 2019-11-06 PROCEDURE — 99284 EMERGENCY DEPT VISIT MOD MDM: CPT

## 2019-11-06 PROCEDURE — 87591 N.GONORRHOEAE DNA AMP PROB: CPT

## 2019-11-06 PROCEDURE — 87491 CHLMYD TRACH DNA AMP PROBE: CPT

## 2019-11-06 PROCEDURE — 81001 URINALYSIS AUTO W/SCOPE: CPT

## 2019-11-06 PROCEDURE — 36415 COLL VENOUS BLD VENIPUNCTURE: CPT

## 2019-11-06 PROCEDURE — 76815 OB US LIMITED FETUS(S): CPT

## 2019-11-06 NOTE — ER DOCUMENT REPORT
ED Medical Screen (RME)





- General


Chief Complaint: Abdominal Pain


Stated Complaint: ABDOMINAL PAIN/HEADACHES


Time Seen by Provider: 11/06/19 14:09


Mode of Arrival: Ambulatory


Information source: Patient


Notes: 





Patient presents 17 weeks pregnant complaining of lower abdominal pain for the 

past week.  Patient denies any fever nausea vomiting diarrhea or urinary 

symptoms.  Patient denies any vaginal bleeding or discharge.





I have greeted and performed a rapid initial assessment of this patient.  A 

comprehensive ED assessment and evaluation of the patient, analysis of test 

results and completion of the medical decision making process will be conducted 

by additional ED providers.


TRAVEL OUTSIDE OF THE [place] IN LAST 30 DAYS: No





- Related Data


Allergies/Adverse Reactions: 


                                        





No Known Allergies Allergy (Verified 08/22/19 07:31)


   











Past Medical History


Pulmonary Medical History: Reports: Hx Asthma


Renal/ Medical History: Denies: Hx Peritoneal Dialysis


Past Surgical History: Reports: Hx Abdominal Surgery - pyloromyotomy as a baby-n

o complications since





- Immunizations


Immunizations up to date: Yes





Physical Exam





- Vital signs


Vitals: 





                                        











Temp Pulse Resp BP Pulse Ox


 


 97.8 F   95   16   110/59 L  99 


 


 11/06/19 13:44  11/06/19 13:44  11/06/19 13:44  11/06/19 13:44  11/06/19 13:44














- Abdominal


Tenderness: Tender - Lower pelvic tenderness





Course





- Vital Signs


Vital signs: 





                                        











Temp Pulse Resp BP Pulse Ox


 


 97.8 F   95   16   110/59 L  99 


 


 11/06/19 13:44  11/06/19 13:44  11/06/19 13:44  11/06/19 13:44  11/06/19 13:44

## 2019-11-06 NOTE — ER DOCUMENT REPORT
HPI





- HPI


Patient complains to provider of: pelvic pain


Time Seen by Provider: 11/06/19 14:09


Onset: Last week


Onset/Duration: Waxing and waning


Quality of pain: Achy


Pain Level: Denies


Context: 





Patient presents with a lower pelvic pain off and on for the past week.  Patient

states she only has pain when she has at work lifting objects above her head.  

Patient states that whenever she is not doing this activity she has no pain.  

Patient denies any injury.  Patient denies any nausea vomiting or diarrhea.  No 

urinary symptoms.  Patient denies any vaginal bleeding or discharge.  Patient is

currently 17 weeks pregnant G2, P1.


Associated Symptoms: denies: Diarrhea, Fever


Exacerbated by: Denies


Relieved by: Denies


Similar symptoms previously: No


Recently seen / treated by doctor: No





- ROS


ROS below otherwise negative: Yes


Systems Reviewed and Negative: Yes All other systems reviewed and negative





- CONSTITUTIONAL


Constitutional: DENIES: Fever, Chills





- NEURO


Neurology: DENIES: Headache, Weakness





- RESPIRATORY


Respiratory: DENIES: Trouble Breathing, Coughing





- GASTROINTESTINAL


Gastrointestinal: REPORTS: Abdominal Pain.  DENIES: Nausea, Patient vomiting, 

Diarrhea, Black / Bloody Stools





- URINARY


Urinary: DENIES: Dysuria, Urgency, Frequency





- REPRODUCTIVE


Reproductive: REPORTS: Pregnant:.  DENIES: Abnormal bleeding / discharge





- MUSCULOSKELETAL


Musculoskeletal: DENIES: Back Pain





- DERM


Skin Color: Normal


Skin Problems: None





Past Medical History





- General


Information source: Patient





- Social History


Smoking Status: Never Smoker


Frequency of alcohol use: None


Drug Abuse: None


Occupation: processing plant


Family History: Reviewed & Not Pertinent


Patient has suicidal ideation: No


Patient has homicidal ideation: No


Pulmonary Medical History: Reports: Hx Asthma


Renal/ Medical History: Denies: Hx Peritoneal Dialysis


Past Surgical History: Reports: Hx Abdominal Surgery - pyloromyotomy as a baby-n

o complications since





- Immunizations


Immunizations up to date: Yes





Vertical Provider Document





- CONSTITUTIONAL


Agree With Documented VS: Yes


Exam Limitations: No Limitations


General Appearance: WD/WN, No Apparent Distress





- INFECTION CONTROL


TRAVEL OUTSIDE OF THE [place] IN LAST 30 DAYS: No





- HEENT


HEENT: Atraumatic, Normocephalic





- NECK


Neck: Normal Inspection, Supple.  negative: Lymphadenopathy-Left, 

Lymphadenopathy-Right





- RESPIRATORY


Respiratory: Breath Sounds Normal, No Respiratory Distress





- CARDIOVASCULAR


Cardiovascular: Regular Rate, Regular Rhythm





- GI/ABDOMEN


Gastrointestinal: Abdomen Soft, Abdomen Non-Tender, No Organomegaly, Normal 

Bowel Sounds





- BACK


Back: Normal Inspection.  negative: CVA Tenderness-Right, CVA Tenderness-Left





- MUSCULOSKELETAL/EXTREMETIES


Musculoskeletal/Extremeties: MAEW, FROM





- NEURO


Level of Consciousness: Awake, Alert, Appropriate


Motor/Sensory: No Motor Deficit





- DERM


Integumentary: Warm, Dry, No Rash





Course





- Re-evaluation


Re-evalutation: 





11/06/19 17:22


Patient's abdomen soft nontender.  Patient without any acute findings on 

diagnostic evaluation.  Patient reports pain only when she is at work lifting 

objects above her head.  Will provide patient with a note for work and encourage

outpatient follow-up with her OB/GYN for recheck.  No concern for appendicitis, 

pyelonephritis or any intra-abdominal acute process at this time.





- Vital Signs


Vital signs: 


                                        











Temp Pulse Resp BP Pulse Ox


 


 97.8 F   95   16   110/59 L  99 


 


 11/06/19 13:44  11/06/19 13:44  11/06/19 13:44  11/06/19 13:44  11/06/19 13:44














- Laboratory


Result Diagrams: 


                                 11/06/19 14:20





                                 11/06/19 14:20


Laboratory results interpreted by me: 


                                        











  11/06/19 11/06/19 11/06/19





  14:20 14:20 14:20


 


Hgb  11.3 L  


 


Hct  33.2 L  


 


RDW  15.1 H  


 


Eos % (Auto)  7.1 H  


 


BUN   6 L 


 


Creatinine   0.46 L 


 


Glucose   72 L 


 


Urine Protein    30 H


 


Urine Ketones    TRACE H


 


Urine Urobilinogen    2.0 H


 


Urine Ascorbic Acid    40 H











11/06/19 17:10


                               Labs- Entire Visit











  11/06/19 11/06/19 11/06/19





  14:20 14:20 14:20


 


WBC  7.3  


 


RBC  3.72  


 


Hgb  11.3 L  


 


Hct  33.2 L  


 


MCV  89  


 


MCH  30.3  


 


MCHC  34.0  


 


RDW  15.1 H  


 


Plt Count  370  


 


Lymph % (Auto)  30.4  


 


Mono % (Auto)  8.4  


 


Eos % (Auto)  7.1 H  


 


Baso % (Auto)  1.4  


 


Absolute Neuts (auto)  3.8  


 


Absolute Lymphs (auto)  2.2  


 


Absolute Monos (auto)  0.6  


 


Absolute Eos (auto)  0.5  


 


Absolute Basos (auto)  0.1  


 


Seg Neutrophils %  52.7  


 


Sodium   138.0 


 


Potassium   4.0 


 


Chloride   105 


 


Carbon Dioxide   23 


 


Anion Gap   10 


 


BUN   6 L 


 


Creatinine   0.46 L 


 


Est GFR ( Amer)   > 60 


 


Est GFR (MDRD) Non-Af   > 60 


 


Glucose   72 L 


 


Calcium   9.5 


 


Total Bilirubin   0.3 


 


Direct Bilirubin   0.1 


 


Neonat Total Bilirubin   Not Reportable 


 


Neonat Direct Bilirubin   Not Reportable 


 


Neonat Indirect Bili   Not Reportable 


 


AST   16 


 


ALT   8 


 


Alkaline Phosphatase   47 


 


Total Protein   6.7 


 


Albumin   3.7 


 


Urine Color   


 


Urine Appearance   


 


Urine pH   


 


Ur Specific Gravity   


 


Urine Protein   


 


Urine Glucose (UA)   


 


Urine Ketones   


 


Urine Blood   


 


Urine Nitrite (Reflex)   


 


Urine Bilirubin   


 


Urine Urobilinogen   


 


Leukocyte Esterase Rfl   


 


Urine RBC (Auto)   


 


U Hyaline Cast (Auto)   


 


Urine Bacteria (Auto)   


 


Urine WBC (Reflex)   


 


Squamous Epi Cells Auto   


 


Urine Mucus (Auto)   


 


Urine Ascorbic Acid   


 


Chlamydia DNA (PCR)    NOT DETECTED


 


N.gonorrhoeae DNA (PCR)    NOT DETECTED














  11/06/19





  14:20


 


WBC 


 


RBC 


 


Hgb 


 


Hct 


 


MCV 


 


MCH 


 


MCHC 


 


RDW 


 


Plt Count 


 


Lymph % (Auto) 


 


Mono % (Auto) 


 


Eos % (Auto) 


 


Baso % (Auto) 


 


Absolute Neuts (auto) 


 


Absolute Lymphs (auto) 


 


Absolute Monos (auto) 


 


Absolute Eos (auto) 


 


Absolute Basos (auto) 


 


Seg Neutrophils % 


 


Sodium 


 


Potassium 


 


Chloride 


 


Carbon Dioxide 


 


Anion Gap 


 


BUN 


 


Creatinine 


 


Est GFR ( Amer) 


 


Est GFR (MDRD) Non-Af 


 


Glucose 


 


Calcium 


 


Total Bilirubin 


 


Direct Bilirubin 


 


Neonat Total Bilirubin 


 


Neonat Direct Bilirubin 


 


Neonat Indirect Bili 


 


AST 


 


ALT 


 


Alkaline Phosphatase 


 


Total Protein 


 


Albumin 


 


Urine Color  YELLOW


 


Urine Appearance  SLIGHTLY-CLOUDY


 


Urine pH  5.0


 


Ur Specific Gravity  1.029


 


Urine Protein  30 H


 


Urine Glucose (UA)  NEGATIVE


 


Urine Ketones  TRACE H


 


Urine Blood  NEGATIVE


 


Urine Nitrite (Reflex)  NEGATIVE


 


Urine Bilirubin  NEGATIVE


 


Urine Urobilinogen  2.0 H


 


Leukocyte Esterase Rfl  NEGATIVE


 


Urine RBC (Auto)  1


 


U Hyaline Cast (Auto)  2


 


Urine Bacteria (Auto)  TRACE


 


Urine WBC (Reflex)  1


 


Squamous Epi Cells Auto  4


 


Urine Mucus (Auto)  MANY


 


Urine Ascorbic Acid  40 H


 


Chlamydia DNA (PCR) 


 


N.gonorrhoeae DNA (PCR) 














- Diagnostic Test


Radiology reviewed: Reports reviewed





Discharge





- Discharge


Clinical Impression: 


 Intrauterine pregnancy, resolved pelvic pain





Condition: Stable


Disposition: HOME, SELF-CARE


Instructions:  Acetaminophen, Pelvic Pain in Pregnancy and Round Ligament Pain 

([facility])


Additional Instructions: 


Return immediately for any new or worsening symptoms





Followup with your GYN care provider, call tomorrow to make a followup 

appointment





Avoid heavy lifting








Forms:  Return to Work


Referrals: 


[facility] [Provider Group] - Follow up as needed

## 2019-11-06 NOTE — RADIOLOGY REPORT (SQ)
EXAM DESCRIPTION:  U/S OB LIMITED



COMPLETED DATE/TIME:  11/6/2019 3:37 pm



REASON FOR STUDY:  lower pelvic pain



COMPARISON:  None.



TECHNIQUE:  Limited transabdominal grayscale ultrasound for evaluation of specific requested obstetri
miriam parameters.



LIMITATIONS:  None.



FINDINGS:  CERVICAL LENGTH: 3.3 cm.   Closed.

PHIL: Clear, adequate in volume.

FHR: 135 beats per minute.

PRESENTATION: Breech.

PLACENTA: Anterior location grade 1

FETAL ANATOMY: Not assessed

OTHER: No other significant findings.



IMPRESSION:  LIMITED OBSTETRICAL ULTRASOUND WITH MEASURED PARAMETERS DELINEATED ABOVE.

Trimester of pregnancy: Second trimester - 13 weeks 1 day to 27 weeks 6 days.



TECHNICAL DOCUMENTATION:  JOB ID:  [id number]

 2011 [employer]- All Rights Reserved



Reading location - IP/workstation name: [email or web address]

## 2019-11-22 ENCOUNTER — HOSPITAL ENCOUNTER (EMERGENCY)
Dept: HOSPITAL 62 - ER | Age: 19
Discharge: HOME | End: 2019-11-22
Payer: MEDICAID

## 2019-11-22 VITALS — DIASTOLIC BLOOD PRESSURE: 68 MMHG | SYSTOLIC BLOOD PRESSURE: 114 MMHG

## 2019-11-22 DIAGNOSIS — B96.89: ICD-10-CM

## 2019-11-22 DIAGNOSIS — O98.819: ICD-10-CM

## 2019-11-22 DIAGNOSIS — B37.3: ICD-10-CM

## 2019-11-22 DIAGNOSIS — J45.909: ICD-10-CM

## 2019-11-22 DIAGNOSIS — Z76.0: ICD-10-CM

## 2019-11-22 DIAGNOSIS — O23.599: Primary | ICD-10-CM

## 2019-11-22 DIAGNOSIS — O99.519: ICD-10-CM

## 2019-11-22 DIAGNOSIS — Z3A.00: ICD-10-CM

## 2019-11-22 LAB
APPEARANCE UR: CLEAR
APTT PPP: YELLOW S
BACTERIA (WET MOUNT): (no result)
BILIRUB UR QL STRIP: NEGATIVE
CHLAM PCR: NOT DETECTED
EPITHELIALS (WET MOUNT): (no result)
GLUCOSE UR STRIP-MCNC: 50 MG/DL
KETONES UR STRIP-MCNC: NEGATIVE MG/DL
NITRITE UR QL STRIP: NEGATIVE
PH UR STRIP: 8 [PH] (ref 5–9)
PROT UR STRIP-MCNC: NEGATIVE MG/DL
RBCS (WET MOUNT): (no result)
SP GR UR STRIP: 1.02
T.VAGINALIS (WET MOUNT): (no result)
UROBILINOGEN UR-MCNC: 2 MG/DL (ref ?–2)
WBCS (WET MOUNT): (no result)
YEAST (WET MOUNT): (no result)

## 2019-11-22 PROCEDURE — 87591 N.GONORRHOEAE DNA AMP PROB: CPT

## 2019-11-22 PROCEDURE — 81001 URINALYSIS AUTO W/SCOPE: CPT

## 2019-11-22 PROCEDURE — 87210 SMEAR WET MOUNT SALINE/INK: CPT

## 2019-11-22 PROCEDURE — 87491 CHLMYD TRACH DNA AMP PROBE: CPT

## 2019-11-22 PROCEDURE — 99284 EMERGENCY DEPT VISIT MOD MDM: CPT

## 2019-11-22 NOTE — ER DOCUMENT REPORT
ED General





- General


Chief Complaint: Vaginal Itching


Stated Complaint: PELVIC PAIN


Time Seen by Provider: 11/22/19 08:19


TRAVEL OUTSIDE OF THE [place] IN LAST 30 DAYS: No





- HPI


Notes: 





19-year-old female to the emergency department with complaints of vaginal 

itching for the past 2 to 3 days.  She states that she is about 19 weeks 

pregnant and is followed at [facility].  She states that she has not had any 

lower abdominal pain or vaginal bleeding.  She states that she has not seen a 

vaginal discharge.  She states that she does not typically have any bacterial 

vaginosis or yeast problems.  She denies concern for STDs.  She denies any 

urinary complaints.  She denies any chest pain, shortness of breath, fevers, 

chills, abdominal pain, back pain.





- Related Data


Allergies/Adverse Reactions: 


                                        





No Known Allergies Allergy (Verified 11/22/19 07:11)


   











Past Medical History





- General


Information source: Patient





- Social History


Smoking Status: Never Smoker


Frequency of alcohol use: None


Drug Abuse: None


Family History: Reviewed & Not Pertinent


Patient has suicidal ideation: No


Patient has homicidal ideation: No


Pulmonary Medical History: Reports: Hx Asthma


Renal/ Medical History: Denies: Hx Peritoneal Dialysis


Past Surgical History: Reports: Hx Abdominal Surgery - pyloromyotomy as a baby-

no complications since





- Immunizations


Immunizations up to date: Yes





Review of Systems





- Review of Systems


Constitutional: denies: Chills, Fever


EENT: No symptoms reported


Cardiovascular: denies: Chest pain, Palpitations


Respiratory: denies: Cough, Short of breath


Gastrointestinal: denies: Abdominal pain, Diarrhea, Nausea, Vomiting


Genitourinary: denies: Discharge, Frequency, Flank pain, Hematuria, Incontinence


Female Genitourinary: Pregnant, Other - Vaginal itching.  denies: Vaginal 

discharge, Vaginal bleeding, Vaginal odor, Painful intercourse


Musculoskeletal: No symptoms reported


Skin: denies: Rash


Neurological/Psychological: No symptoms reported


-: Yes All other systems reviewed and negative





Physical Exam





- Vital signs


Vitals: 


                                        











Temp Pulse Resp BP Pulse Ox


 


 98.2 F   97   17   114/61   100 


 


 11/22/19 07:14  11/22/19 07:14  11/22/19 07:14  11/22/19 07:14  11/22/19 07:14











Interpretation: Normal





- General


General appearance: Appears well, Alert


In distress: None





- HEENT


Head: Normocephalic, Atraumatic


Eyes: Normal


Pupils: PERRL





- Respiratory


Respiratory status: No respiratory distress


Chest status: Nontender


Breath sounds: Normal


Chest palpation: Normal





- Cardiovascular


Rhythm: Regular


Heart sounds: Normal auscultation


Murmur: No





- Abdominal


Inspection: Normal


Distension: Other - Gravid abdomen, fundus is just below the umbilicusfetal 

heart tones are reassuring at 143


Bowel sounds: Normal


Tenderness: Nontender.  No: McBurney's point, Duke's sign, Guarding, Rebound


Organomegaly: No organomegaly





- Back


Back: Normal, Nontender.  No: CVA tenderness





- Neurological


Neuro grossly intact: Yes


Cognition: Normal


Orientation: AAOx4


[place] Coma Scale Eye Opening: Spontaneous


[place] Coma Scale Verbal: Oriented


[place]lie Coma Scale Motor: Obeys Commands


Ashlie Coma Scale Total: 15


Speech: Normal


Cranial nerves: Normal


Cerebellar coordination: Normal


Motor strength normal: LUE, RUE, LLE, RLE


Additional motor exam normals: Equal 


Sensory: Normal





- Psychological


Associated symptoms: Normal affect, Normal mood





- Skin


Skin Temperature: Warm


Skin Moisture: Dry


Skin Color: Normal





Course





- Re-evaluation


Re-evalutation: 





11/22/19 





Laboratory











  11/22/19 11/22/19





  07:58 09:09


 


Urine Color  YELLOW 


 


Urine Appearance  CLEAR 


 


Urine pH  8.0 


 


Ur Specific Gravity  1.017 


 


Urine Protein  NEGATIVE 


 


Urine Glucose (UA)  50 H 


 


Urine Ketones  NEGATIVE 


 


Urine Blood  NEGATIVE 


 


Urine Nitrite  NEGATIVE 


 


Urine Bilirubin  NEGATIVE 


 


Urine Urobilinogen  2.0 H 


 


Ur Leukocyte Esterase  TRACE H 


 


Urine WBC (Auto)  1 


 


Urine RBC (Auto)  0 


 


Squamous Epi Cells Auto  3 


 


Urine Mucus (Auto)  RARE 


 


Urine Ascorbic Acid  NEGATIVE 


 


Epi Cells (Wet Prep)   3+ EPITHELIALS SEEN


 


Bacteria (Wet Prep)   4+ BACTERIA SEEN


 


Trichomonas (Wet Prep)   NO TRICHOMONAS SEEN


 


Vaginal WBC   4+ WBCS SEEN


 


Vaginal RBC   RARE RBCS SEEN


 


Vaginal Yeast   YEAST SEEN








Impression: Vaginal itching, vaginal yeast infection, BV.  We will go ahead and 

start treatment.  We will give clotrimazole vaginal inserts and Flagyl.  

Discussed pack pregnancy category with patient about Flagyl.  We will have her 

follow-up with OB/GYN.  Encouraged to return if she has any worsening symptoms 

or any concerns.  Patient agrees with the plan.








- Vital Signs


Vital signs: 


                                        











Temp Pulse Resp BP Pulse Ox


 


 98.4 F   99   18   114/68   100 


 


 11/22/19 09:50  11/22/19 09:50  11/22/19 09:50  11/22/19 09:50  11/22/19 09:50














- Laboratory


Laboratory results interpreted by me: 


                                        











  11/22/19





  07:58


 


Urine Glucose (UA)  50 H


 


Urine Urobilinogen  2.0 H


 


Ur Leukocyte Esterase  TRACE H














Discharge





- Discharge


Clinical Impression: 


 Vaginal itching, Pregnancy, Bacterial vaginosis in pregnancy, Vaginal yeast 

infection, Medication refill





Condition: Stable


Disposition: HOME, SELF-CARE


Instructions:  Vaginal Yeast Infection ([facility]), Vaginosis, Bacterial ([facility])


Additional Instructions: 


FOLLOW UP WITH OB IN THE NEXT WEEK.  RETURN IF ANY WORSENING SYMPTOMS.  COMPLETE

ANTIBIOTICS AND USE VAGINAL INSERT AS DIRECTED. 


Prescriptions: 


Clotrimazole [3-Day Vaginal Cream] 1 appful VG DAILY #7 cream.appl


Albuterol Sulfate [Albuterol Sulfate Hfa] 2 puff IH Q4H PRN #1 hfa.aer.ad


 PRN Reason: 


Metronidazole [Flagyl 500 mg Tablet] 500 mg PO BID #14 tablet


Forms:  Return to Work

## 2019-11-25 ENCOUNTER — HOSPITAL ENCOUNTER (OUTPATIENT)
Dept: HOSPITAL 62 - LC | Age: 19
Discharge: HOME | End: 2019-11-25
Attending: OBSTETRICS & GYNECOLOGY
Payer: MEDICAID

## 2019-11-25 DIAGNOSIS — A60.00: ICD-10-CM

## 2019-11-25 DIAGNOSIS — O98.312: Primary | ICD-10-CM

## 2019-11-25 DIAGNOSIS — Z3A.21: ICD-10-CM

## 2019-11-25 LAB
APPEARANCE UR: (no result)
APTT PPP: (no result) S
BARBITURATES UR QL SCN: NEGATIVE
BILIRUB UR QL STRIP: NEGATIVE
GLUCOSE UR STRIP-MCNC: NEGATIVE MG/DL
KETONES UR STRIP-MCNC: (no result) MG/DL
METHADONE UR QL SCN: NEGATIVE
NITRITE UR QL STRIP: NEGATIVE
PCP UR QL SCN: NEGATIVE
PH UR STRIP: 5 [PH] (ref 5–9)
PROT UR STRIP-MCNC: 30 MG/DL
SP GR UR STRIP: 1.04
URINE AMPHETAMINES SCREEN: NEGATIVE
URINE BENZODIAZEPINES SCREEN: NEGATIVE
URINE MARIJUANA (THC) SCREEN: NEGATIVE
UROBILINOGEN UR-MCNC: NEGATIVE MG/DL (ref ?–2)

## 2019-11-25 PROCEDURE — 81001 URINALYSIS AUTO W/SCOPE: CPT

## 2019-11-25 PROCEDURE — 80307 DRUG TEST PRSMV CHEM ANLYZR: CPT

## 2019-11-25 PROCEDURE — 36415 COLL VENOUS BLD VENIPUNCTURE: CPT

## 2019-11-25 PROCEDURE — 80353 DRUG SCREENING COCAINE: CPT

## 2019-11-25 PROCEDURE — G0480 DRUG TEST DEF 1-7 CLASSES: HCPCS

## 2019-12-06 ENCOUNTER — HOSPITAL ENCOUNTER (EMERGENCY)
Dept: HOSPITAL 62 - ER | Age: 19
Discharge: HOME | End: 2019-12-06
Payer: COMMERCIAL

## 2019-12-06 VITALS — DIASTOLIC BLOOD PRESSURE: 74 MMHG | SYSTOLIC BLOOD PRESSURE: 123 MMHG

## 2019-12-06 DIAGNOSIS — O26.92: Primary | ICD-10-CM

## 2019-12-06 DIAGNOSIS — S00.531A: ICD-10-CM

## 2019-12-06 DIAGNOSIS — S00.81XA: ICD-10-CM
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
## 2019-12-06 DIAGNOSIS — Z3A.21: ICD-10-CM

## 2019-12-06 DIAGNOSIS — Z23: ICD-10-CM

## 2019-12-06 DIAGNOSIS — S20.211A: ICD-10-CM

## 2019-12-06 DIAGNOSIS — R07.89: ICD-10-CM

## 2019-12-06 PROCEDURE — 71046 X-RAY EXAM CHEST 2 VIEWS: CPT

## 2019-12-06 PROCEDURE — 90715 TDAP VACCINE 7 YRS/> IM: CPT

## 2019-12-06 PROCEDURE — 99283 EMERGENCY DEPT VISIT LOW MDM: CPT

## 2019-12-06 PROCEDURE — 90471 IMMUNIZATION ADMIN: CPT

## 2019-12-06 NOTE — RADIOLOGY REPORT (SQ)
EXAM DESCRIPTION:  CHEST 2 VIEWS



COMPLETED DATE/TIME:  12/6/2019 7:19 pm



REASON FOR STUDY:  right chest wall pain



COMPARISON:  None.



EXAM PARAMETERS:  NUMBER OF VIEWS: two views

TECHNIQUE: Digital Frontal and Lateral radiographic views of the chest acquired.

RADIATION DOSE: NA

LIMITATIONS: none



FINDINGS:  LUNGS AND PLEURA: No opacities, masses or pneumothorax. No pleural effusion.

MEDIASTINUM AND HILAR STRUCTURES: No masses or contour abnormalities.

HEART AND VASCULAR STRUCTURES: Heart normal size.  No evidence for failure.

BONES: No acute findings.

HARDWARE: None in the chest.

OTHER: No other significant finding.



IMPRESSION:  NO ACUTE RADIOGRAPHIC FINDING IN THE CHEST.



TECHNICAL DOCUMENTATION:  JOB ID:  [id number]

 2011 [employer]- All Rights Reserved



Reading location - IP/workstation name: [email or web address]
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
